# Patient Record
Sex: FEMALE
[De-identification: names, ages, dates, MRNs, and addresses within clinical notes are randomized per-mention and may not be internally consistent; named-entity substitution may affect disease eponyms.]

---

## 2018-01-02 ENCOUNTER — HOSPITAL ENCOUNTER (EMERGENCY)
Dept: HOSPITAL 25 - UCCORT | Age: 38
Discharge: LEFT BEFORE DISCHARGE | End: 2018-01-02
Payer: COMMERCIAL

## 2018-01-02 DIAGNOSIS — H93.93: Primary | ICD-10-CM

## 2018-01-02 DIAGNOSIS — Z53.21: ICD-10-CM

## 2019-01-20 ENCOUNTER — HOSPITAL ENCOUNTER (EMERGENCY)
Dept: HOSPITAL 25 - UCCORT | Age: 39
Discharge: HOME | End: 2019-01-20
Payer: COMMERCIAL

## 2019-01-20 VITALS — DIASTOLIC BLOOD PRESSURE: 77 MMHG | SYSTOLIC BLOOD PRESSURE: 135 MMHG

## 2019-01-20 DIAGNOSIS — M54.2: ICD-10-CM

## 2019-01-20 DIAGNOSIS — H92.03: ICD-10-CM

## 2019-01-20 DIAGNOSIS — J02.0: Primary | ICD-10-CM

## 2019-01-20 PROCEDURE — G0463 HOSPITAL OUTPT CLINIC VISIT: HCPCS

## 2019-01-20 PROCEDURE — 99212 OFFICE O/P EST SF 10 MIN: CPT

## 2019-01-20 PROCEDURE — 87651 STREP A DNA AMP PROBE: CPT

## 2019-01-20 NOTE — ED
Throat Pain/Nasal Congestion





- HPI Summary


HPI Summary: 





sore throat last several days with some pain in the neck and the ears as well. 

did not check temperature at home. son with Strep throat 





- History of Current Complaint


Chief Complaint: UCGeneralIllness


Time Seen by Provider: 01/20/19 13:02


Hx Obtained From: Patient


Onset/Duration: Gradual Onset, Lasting Days


Severity: Moderate





- Epiglottits Risk Factors


Epiglottis Risk Factors: Negative





- Allergies/Home Medications


Allergies/Adverse Reactions: 


 Allergies











Allergy/AdvReac Type Severity Reaction Status Date / Time


 


No Known Allergies Allergy   Verified 01/20/19 13:06











Home Medications: 


 Home Medications





Ascorbic Acid/Multivit-Min [Emergen-C 1,000 mg Packet] 1 each PO ONCE 01/20/19 [

History Confirmed 01/20/19]


Ibuprofen 600 mg PO ONCE 01/20/19 [History Confirmed 01/20/19]











PMH/Surg Hx/FS Hx/Imm Hx


Previously Healthy: Yes





- Surgical History


Surgery Procedure, Year, and Place: 4 C-SECTIONS


Infectious Disease History: No


Infectious Disease History: 


   Denies: Traveled Outside the [place] in Last 30 Days





- Social History


Alcohol Use: Rare


Substance Use Type: Reports: None


Smoking Status (MU): Never Smoked Tobacco





Review of Systems


Constitutional: Negative


Eyes: Negative


Positive: Sore Throat


Cardiovascular: Negative


Respiratory: Negative


Gastrointestinal: Negative


Genitourinary: Negative


Musculoskeletal: Negative


Skin: Negative


All Other Systems Reviewed And Are Negative: Yes





Physical Exam


Triage Information Reviewed: Yes


Vital Signs On Initial Exam: 


 Initial Vitals











Temp Pulse Resp BP Pulse Ox


 


 37.3 C   94   18   135/77   100 


 


 01/20/19 13:03  01/20/19 13:03  01/20/19 13:03  01/20/19 13:03  01/20/19 13:03











Vital Signs Reviewed: Yes


Appearance: Positive: Well-Appearing


Skin: Positive: Warm


Head/Face: Positive: Normal Head/Face Inspection


ENT: Positive: TMs normal, Tonsillar swelling


Neck: Positive: Supple, Enlarged Nodes @ - anterior cervical nodes


Cardiovascular: Positive: Normal


Abdomen Description: Positive: Nontender


Bowel Sounds: Positive: Present





Diagnostics





- Vital Signs


 Vital Signs











  Temp Pulse Resp BP Pulse Ox


 


 01/20/19 13:03  37.3 C  94  18  135/77  100














- Laboratory


Lab Statement: Any lab studies that have been ordered have been reviewed, and 

results considered in the medical decision making process.





EENT Course/Dx





- Diagnoses


Provider Diagnoses: 


 Strep pharyngitis








Discharge





- Sign-Out/Discharge


Documenting (check all that apply): Patient Departure


All imaging exams completed and their final reports reviewed: No Studies





- Discharge Plan


Condition: Good


Disposition: HOME


Prescriptions: 


Cephalexin CAP* [Keflex 500 CAP*] 500 mg PO TID 10 Days #30 cap


Patient Education Materials:  Strep Throat (DC)


Referrals: 


Luke Gruber DO [Primary Care Provider] - 





- Billing Disposition and Condition


Condition: GOOD


Disposition: Home

## 2019-08-09 ENCOUNTER — HOSPITAL ENCOUNTER (EMERGENCY)
Dept: HOSPITAL 25 - UCCORT | Age: 39
Discharge: HOME | End: 2019-08-09
Payer: COMMERCIAL

## 2019-08-09 VITALS — SYSTOLIC BLOOD PRESSURE: 122 MMHG | DIASTOLIC BLOOD PRESSURE: 82 MMHG

## 2019-08-09 DIAGNOSIS — R50.9: ICD-10-CM

## 2019-08-09 DIAGNOSIS — J40: Primary | ICD-10-CM

## 2019-08-09 DIAGNOSIS — Z88.2: ICD-10-CM

## 2019-08-09 PROCEDURE — G0463 HOSPITAL OUTPT CLINIC VISIT: HCPCS

## 2019-08-09 PROCEDURE — 99212 OFFICE O/P EST SF 10 MIN: CPT

## 2019-08-09 NOTE — UC
Respiratory Complaint HPI





- HPI Summary


HPI Summary: 





Pt presents with c/o sudden onset of cough, fever, chest  congestion, and 

wheezing X 7 days.  Pt states that she has been taking OTC mucinex with no 

improvement of symptoms. 





- History of Current Complaint


Chief Complaint: UCRespiratory


Stated Complaint: COUGH


Time Seen by Provider: 08/09/19 19:25


Hx Obtained From: Patient


Hx Last Menstrual Period: 8/7/19


Pregnant?: No


Onset/Duration: Sudden Onset, Lasting Weeks - 1, Worse Since - osnet


Timing: Constant


Severity Initially: Mild


Severity Currently: Moderate


Pain Intensity: 3


Character: Cough: Nonproductive


Aggravating Factors: Deep Breaths, Recumbent Position


Alleviating Factors: Nothing


Associated Signs And Symptoms: Positive: Fever, Chills, Wheezing, Nasal 

Congestion





- Risk Factors


Pulmonary Embolism Risk Factors: Negative


Cardiac Risk Factors: Negative


Pseudomonas Risk Factors: Negative


Tuberculosis Risk Factors: Negative





- Allergies/Home Medications


Allergies/Adverse Reactions: 


 Allergies











Allergy/AdvReac Type Severity Reaction Status Date / Time


 


Sulfa (Sulfonamide Allergy  See Comment Verified 08/09/19 19:30





Antibiotics)     














PMH/Surg Hx/FS Hx/Imm Hx


Previously Healthy: Yes





- Surgical History


Surgical History: Yes


Surgery Procedure, Year, and Place: 4 C-SECTIONS





- Family History


Known Family History: Positive: Cardiac Disease





- Social History


Occupation: Works From/At Home


Lives: With Family


Alcohol Use: Rare


Substance Use Type: None


Smoking Status (MU): Never Smoked Tobacco


Have You Smoked in the Last Year: No





- Immunization History


Vaccination Up to Date: Yes





Review of Systems


All Other Systems Reviewed And Are Negative: Yes


Constitutional: Positive: Fever, Chills, Fatigue


Skin: Positive: Negative


Eyes: Positive: Negative


ENT: Positive: Sinus Congestion


Respiratory: Positive: Cough


Cardiovascular: Positive: Negative


Gastrointestinal: Positive: Negative


Genitourinary: Positive: Negative


Motor: Positive: Negative


Neurovascular: Positive: Negative


Musculoskeletal: Positive: Myalgia


Neurological: Positive: Negative


Psychological: Positive: Negative





Physical Exam


Triage Information Reviewed: Yes


Appearance: Ill-Appearing


Vital Signs: 


 Initial Vital Signs











Temp  100.8 F   08/09/19 19:27


 


Pulse  103   08/09/19 19:27


 


Resp  18   08/09/19 19:27


 


BP  122/82   08/09/19 19:27


 


Pulse Ox  96   08/09/19 19:27











Vital Signs Reviewed: Yes


Eye Exam: Normal


ENT: Positive: Nasal congestion


Dental Exam: Normal


Neck exam: Normal


Respiratory: Positive: Decreased breath sounds


Cardiovascular: Positive: Tachycardia


Musculoskeletal Exam: Normal


Neurological Exam: Normal


Psychological Exam: Normal


Skin Exam: Normal





Respiratory Course/Dx





- Differential Dx/Diagnosis


Differential Diagnosis/HQI/PQRI: Bronchitis, Influenza, Sinusitis


Provider Diagnosis: 


 Bronchitis, Fever and chills








Discharge





- Sign-Out/Discharge


Documenting (check all that apply): Patient Departure


All imaging exams completed and their final reports reviewed: No Studies





- Discharge Plan


Condition: Stable


Disposition: HOME


Prescriptions: 


Azithromycin TAB* [Zithromax TAB (Z-ESTEBAN) 250 mg #6 tabs] 2 tab PO .TODAY, THEN 

1 DAILY #1 esteban


Benzonatate CAP* [Tessalon 100 MG CAP*] 200 mg PO Q8H PRN #30 cap


 PRN Reason: Cough


predniSONE TAB* [Deltasone 20 MG TAB*] 20 mg PO DAILY #4 tab


Patient Education Materials:  Acute Bronchitis (ED)


Referrals: 


Luke Gruber DO [Primary Care Provider] - If Needed





- Billing Disposition and Condition


Condition: STABLE


Disposition: Home

## 2020-01-24 ENCOUNTER — HOSPITAL ENCOUNTER (EMERGENCY)
Dept: HOSPITAL 25 - UCCORT | Age: 40
Discharge: HOME | End: 2020-01-24
Payer: COMMERCIAL

## 2020-01-24 VITALS — DIASTOLIC BLOOD PRESSURE: 72 MMHG | SYSTOLIC BLOOD PRESSURE: 130 MMHG

## 2020-01-24 DIAGNOSIS — Z88.2: ICD-10-CM

## 2020-01-24 DIAGNOSIS — H93.8X1: Primary | ICD-10-CM

## 2020-01-24 PROCEDURE — G0463 HOSPITAL OUTPT CLINIC VISIT: HCPCS

## 2020-01-24 PROCEDURE — 99212 OFFICE O/P EST SF 10 MIN: CPT

## 2020-01-24 NOTE — UC
Ear Complaint HPI





- HPI Summary


HPI Summary: 


39-year-old female presenting with right ear pressure 2 days.  Patient states 

it feels like there is fluid in her ear.  Denies pain.  Denies drainage and 

hearing changes.  She states she had URI symptoms last week that had since 

resolved.  Denies fever and chills.  Has not taken anything for symptom relief.








- History of Current Complaint


Chief Complaint: UCEar


Stated Complaint: EAR COMPLAINT


Hx Obtained From: Patient


Hx Last Menstrual Period: 8/7/19


Pain Intensity: 0





- Allergies/Home Medications


Allergies/Adverse Reactions: 


 Allergies











Allergy/AdvReac Type Severity Reaction Status Date / Time


 


Sulfa (Sulfonamide Allergy  See Comment Verified 01/24/20 13:39





Antibiotics)     











Home Medications: 


 Home Medications





Vit C/Ascorb Sod/Multivit-Min [Emergen-C Vitamin C] 1 dose PO ONCE 01/24/20 [

History Confirmed 01/24/20]











PMH/Surg Hx/FS Hx/Imm Hx


Previously Healthy: Yes





- Surgical History


Surgical History: Yes


Surgery Procedure, Year, and Place: 4 C-SECTIONS





- Family History


Known Family History: Positive: Cardiac Disease, Non-Contributory





- Social History


Alcohol Use: None


Substance Use Type: None


Smoking Status (MU): Never Smoked Tobacco


Have You Smoked in the Last Year: No





- Immunization History


Vaccination Up to Date: Yes





Review of Systems


All Other Systems Reviewed And Are Negative: Yes


Constitutional: Positive: Negative.  Negative: Fever, Chills


ENT: Positive: Ear Ache - right, Sinus Congestion - last week.  Negative: Sore 

Throat


Respiratory: Positive: Negative


Cardiovascular: Positive: Negative


Gastrointestinal: Positive: Negative


Neurological: Positive: Negative





Physical Exam





- Summary


Physical Exam Summary: 


Vital Signs Reviewed: Yes


A+Ox3, no distress


Eyes: Conjunctiva Clear


ENT: Hearing grossly normal, TM x 2 clear, moist, uvula midline, no exudate, no 

erythema


Neck: Positive: Supple


Respiratory: Positive: No respiratory distress, No accessory muscle use + CTA 

throughout  no w/r


Cardiovascular: RRR  nl s1, s2  no m/r 


Musculoskeletal Exam: LANG x 4 without difficulty


Neurological: Positive: Alert


Psychological: Positive: age appropriate behavior


Skin: Positive: no rash, no ecchymosis








Vital Signs: 


 Initial Vital Signs











Temp  98.7 F   01/24/20 13:40


 


Pulse  73   01/24/20 13:40


 


Resp  18   01/24/20 13:40


 


BP  130/72   01/24/20 13:40


 


Pulse Ox  100   01/24/20 13:40














Ear Complaint Course/Dx





- Course


Course Of Treatment: 


Patient presenting with R ear pressure "fluid feeling" x2 days after URI 

symptoms last week. Patient with normal TMs b/l. Instructed to take otc 

decongestants and flonase for symptom relief. Instructed to follow up with pcp 

if symptoms worsen or persist. Patient voiced understanding and agreed with 

treatment plan.








- Differential Dx/Diagnosis


Differential Diagnosis/HQI/PQRI: Otitis Media, URI


Provider Diagnosis: 


 Pressure sensation in right ear








Discharge ED





- Sign-Out/Discharge


Documenting (check all that apply): Patient Departure


All imaging exams completed and their final reports reviewed: No Studies





- Discharge Plan


Condition: Stable


Disposition: HOME


Prescriptions: 


Fluticasone NASAL SPRAY 50MCG* [Flonase NASAL SPRAY 50MCG*] 2 spray BOTH NARES 

DAILY PRN #1 btl


 PRN Reason: Congestion


Patient Education Materials:  Earache (ED)


Referrals: 


Luke Gruber DO [Primary Care Provider] - If Needed


Additional Instructions: 


You have no signs of an ear infection today.


Take an over the counter decongestant and use Flonase to help alleviate 

symptoms.


Increase your fluid intake.


Follow up with you primary care provider if symptoms worsen or do not resolve 

within 7 days.








- Billing Disposition and Condition


Condition: STABLE


Disposition: Home





- Attestation Statements


Provider Attestation: 





I was available for consult. This patient was seen by the [facility]. The patient was 

not presented to, seen by, or examined by me. -Calvin

## 2020-01-26 ENCOUNTER — HOSPITAL ENCOUNTER (EMERGENCY)
Dept: HOSPITAL 25 - UCCORT | Age: 40
Discharge: HOME | End: 2020-01-26
Payer: COMMERCIAL

## 2020-01-26 VITALS — DIASTOLIC BLOOD PRESSURE: 70 MMHG | SYSTOLIC BLOOD PRESSURE: 119 MMHG

## 2020-01-26 DIAGNOSIS — Z88.2: ICD-10-CM

## 2020-01-26 DIAGNOSIS — J10.1: Primary | ICD-10-CM

## 2020-01-26 DIAGNOSIS — H92.01: ICD-10-CM

## 2020-01-26 LAB — FLUAV RNA SPEC QL NAA+PROBE: POSITIVE

## 2020-01-26 PROCEDURE — 99211 OFF/OP EST MAY X REQ PHY/QHP: CPT

## 2020-01-26 PROCEDURE — 87651 STREP A DNA AMP PROBE: CPT

## 2020-01-26 PROCEDURE — G0463 HOSPITAL OUTPT CLINIC VISIT: HCPCS

## 2020-01-26 NOTE — UC
Throat Pain/Nasal Daniel HPI





- HPI Summary


HPI Summary: 





Pt was seen here on friday for right ear pain. No ear infection. Pt now c/o 

pain right throat. Slight coough as well.Concerned about strep. has felt 

feverish today. 





- History of Current Complaint


Chief Complaint: UCRespiratory


Stated Complaint: SORE THROAT


Time Seen by Provider: 01/26/20 16:33


Hx Obtained From: Patient


Hx Last Menstrual Period: 1/13/19


Pregnant?: No


Onset/Duration: Sudden Onset, Lasting Days


Severity: Mild


Pain Intensity: 3


Associated Signs & Symptoms: Positive: Dysphagia, Sinus Discomfort, Fever





- Allergies/Home Medications


Allergies/Adverse Reactions: 


 Allergies











Allergy/AdvReac Type Severity Reaction Status Date / Time


 


Sulfa (Sulfonamide Allergy  See Comment Verified 01/26/20 16:53





Antibiotics)     











Home Medications: 


 Home Medications





Ibuprofen TAB* [Advil TAB*] 200 mg PO Q6H PRN 01/26/20 [History Confirmed 01/26/ 20]











PMH/Surg Hx/FS Hx/Imm Hx


Previously Healthy: Yes





- Surgical History


Surgical History: Yes


Surgery Procedure, Year, and Place: 4 C-SECTIONS





- Family History


Known Family History: Positive: Cardiac Disease, Non-Contributory





- Social History


Alcohol Use: Occasionally


Substance Use Type: None


Smoking Status (MU): Never Smoked Tobacco


Have You Smoked in the Last Year: No





- Immunization History


Vaccination Up to Date: Yes





Review of Systems


All Other Systems Reviewed And Are Negative: Yes


Constitutional: Positive: Fever, Fatigue


ENT: Positive: Sore Throat, Ear Ache, Nasal Discharge, Sinus Congestion


Respiratory: Positive: Cough


Neurological: Positive: Headache


Is Patient Immunocompromised?: No





Physical Exam


Triage Information Reviewed: Yes


Appearance: Well-Nourished, Ill-Appearing, Pain Distress


Vital Signs: 


 Initial Vital Signs











Temp  100.4 F   01/26/20 16:55


 


Pulse  104   01/26/20 16:55


 


Resp  18   01/26/20 16:55


 


BP  119/70   01/26/20 16:55


 


Pulse Ox  100   01/26/20 16:55











Vital Signs Reviewed: Yes


Eye Exam: Normal


ENT: Positive: Pharyngeal erythema, TM bulging - right


Neck exam: Normal


Respiratory Exam: Normal


Cardiovascular Exam: Normal


Abdominal Exam: Normal


Musculoskeletal Exam: Normal


Neurological Exam: Normal


Psychological Exam: Normal


Skin Exam: Normal





Throat Pain/Nasal Course/Dx





- Course


Course Of Treatment: 





hx obtained, exam performed ,meds reviewed, rapid flu and strep obtained.





- Differential Dx/Diagnosis


Differential Diagnosis/HQI/PQRI: Influenza, Laryngitis, Pharyngitis, Sinusitis, 

URI


Provider Diagnosis: 


 Influenza A








Discharge ED





- Sign-Out/Discharge


Documenting (check all that apply): Patient Departure


All imaging exams completed and their final reports reviewed: No Studies





- Discharge Plan


Condition: Stable


Disposition: HOME


Patient Education Materials:  Influenza (DC)


Referrals: 


Luke Gruber DO [Primary Care Provider] - 


Additional Instructions: 


1. REST


2. Clear fluids


3. ibuprofen and tylenol for pain and fever


4. Heat and massage the neck under the ear to help with ear drainage.


5. Follow up with any symptoms that are not being managed with conservative 

treatment





- Billing Disposition and Condition


Condition: STABLE


Disposition: Home





- Attestation Statements


Provider Attestation: 


I was available for consult. This patient was seen by the [facility]. The patient was 

not presented to , seen by or examined by me -Cornell Guerra MD